# Patient Record
Sex: FEMALE | Race: WHITE | Employment: OTHER | URBAN - METROPOLITAN AREA
[De-identification: names, ages, dates, MRNs, and addresses within clinical notes are randomized per-mention and may not be internally consistent; named-entity substitution may affect disease eponyms.]

---

## 2018-09-18 ENCOUNTER — APPOINTMENT (OUTPATIENT)
Dept: CT IMAGING | Facility: CLINIC | Age: 48
End: 2018-09-18
Attending: EMERGENCY MEDICINE
Payer: OTHER GOVERNMENT

## 2018-09-18 ENCOUNTER — HOSPITAL ENCOUNTER (EMERGENCY)
Facility: CLINIC | Age: 48
Discharge: HOME | End: 2018-09-18
Attending: EMERGENCY MEDICINE | Admitting: EMERGENCY MEDICINE
Payer: OTHER GOVERNMENT

## 2018-09-18 VITALS
HEART RATE: 76 BPM | WEIGHT: 170 LBS | TEMPERATURE: 98.5 F | OXYGEN SATURATION: 99 % | DIASTOLIC BLOOD PRESSURE: 98 MMHG | BODY MASS INDEX: 27.32 KG/M2 | SYSTOLIC BLOOD PRESSURE: 146 MMHG | HEIGHT: 66 IN | RESPIRATION RATE: 16 BRPM
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
## 2018-09-18 DIAGNOSIS — S93.491A SPRAIN OF ANTERIOR TALOFIBULAR LIGAMENT OF RIGHT ANKLE, INITIAL ENCOUNTER: ICD-10-CM

## 2018-09-18 DIAGNOSIS — S00.83XA CONTUSION OF FACE, INITIAL ENCOUNTER: ICD-10-CM

## 2018-09-18 PROCEDURE — 99284 EMERGENCY DEPT VISIT MOD MDM: CPT | Mod: 25

## 2018-09-18 PROCEDURE — 70450 CT HEAD/BRAIN W/O DYE: CPT

## 2018-09-18 PROCEDURE — 72125 CT NECK SPINE W/O DYE: CPT

## 2018-09-18 ASSESSMENT — ENCOUNTER SYMPTOMS
BACK PAIN: 0
HEADACHES: 1
WOUND: 1
NECK PAIN: 0

## 2018-09-18 NOTE — ED AVS SNAPSHOT
Emergency Department    [address] [place] [address]    Phone:  [phone]    Fax:  [phone]                                       Christie Davis   MRN: [id number]    Department:   Emergency Department   Date of Visit:  9/18/2018           Patient Information     Date Of Birth          1970        Your diagnoses for this visit were:     Fall, initial encounter     Contusion of face, initial encounter     Sprain of anterior talofibular ligament of right ankle, initial encounter        You were seen by Natalia Ocampo MD.      Follow-up Information     Schedule an appointment as soon as possible for a visit with No Ref-Primary, Physician.    Why:  As needed        Discharge Instructions       Activity as tolerated, Motrin as needed.  Ice to the bruised areas.  Recheck in the clinic as needed.  If you develop severe headache with recurrent vomiting, return to the ER.        Understanding Ankle Sprain    The ankle is the joint where the leg and foot meet. Bones are held in place by connective tissue called ligaments. When ankle ligaments are stretched to the point of pain and injury, it is called an ankle sprain. A sprain can tear the ligaments. These tears can be very small but still cause pain. Ankle sprains can be mild or severe.  What causes an ankle sprain?  A sprain may occur when you twist your ankle or bend it too far. This can happen when you stumble or fall. Things that can make an ankle sprain more likely include:    Having had an ankle sprain before    Playing sports that involve running and jumping. Or playing contact sports such as football or hockey.    Wearing shoes that don t support your feet and ankles well    Having ankles with poor strength and flexibility  Symptoms of an ankle sprain  Symptoms may include:    Pain or soreness in the ankle    Swelling    Redness or bruising    Not being able to walk or put weight on the affected foot    Reduced range of  motion in the ankle    A popping or tearing feeling at the time the sprain occurs    An abnormal or dislocated look to the ankle    Instability or too much range of motion in the ankle  Treatment for an ankle sprain  Treatment focuses on reducing pain and swelling, and avoiding further injury. Treatments may include:    Resting the ankle. Avoid putting weight on it. This may mean using crutches until the sprain heals.    Prescription or over-the-counter pain medicines. These help reduce swelling and pain.    Cold packs. These help reduce pain and swelling.    Raising your ankle above your heart. This helps reduce swelling.    Wrapping the ankle with an elastic bandage or ankle brace. This helps reduce swelling and gives some support to the ankle. In rare cases, you may need a cast or boot.    Stretching and other exercises. These improve flexibility and strength.    Heat packs. These may be recommended before doing ankle exercises.  Possible complications of an ankle sprain  An ankle that has been weakened by a sprain can be more likely to have repeated sprains afterward. Doing exercises to strengthen your ankle and improve balance can reduce your risk for repeated sprains. Other possible complications are long-term (chronic) pain or an ankle that remains unstable.  When to call your healthcare provider  Call your healthcare provider right away if you have any of these:    Fever of 100.4 F (38 C) or higher, or as directed    Pain, numbness, discoloration, or coldness in the foot or toes    Pain that gets worse    Symptoms that don t get better, or get worse    New symptoms   Date Last Reviewed: 3/10/2016    [id number] [employer]. [address] [place] [address], [place] [address]. All rights reserved. This information is not intended as a substitute for professional medical care. Always follow your healthcare professional's instructions.          Discharge References/Attachments     HEAD INJURY, NO WAKE-UP  (ADULT) (ENGLISH)      24 Hour Appointment Hotline       To make an appointment at any [facility], call [phone] ([phone]). If you don't have a family doctor or clinic, we will help you find one. [facility] are conveniently located to serve the needs of you and your family.             Review of your medicines      Notice     You have not been prescribed any medications.            Procedures and tests performed during your visit     Cervical spine CT w/o contrast    Head CT w/o contrast      Orders Needing Specimen Collection     None      Pending Results     No orders found from 9/16/2018 to 9/19/2018.            Pending Culture Results     No orders found from 9/16/2018 to 9/19/2018.            Pending Results Instructions     If you had any lab results that were not finalized at the time of your Discharge, you can call the ED Lab Result RN at [phone]. You will be contacted by this team for any positive Lab results or changes in treatment. The nurses are available 7 days a week from 10A to 6:30P.  You can leave a message 24 hours per day and they will return your call.        Test Results From Your Hospital Stay        9/18/2018 10:05 PM      Narrative     CT SCAN OF THE HEAD WITHOUT CONTRAST   9/18/2018 9:42 PM     HISTORY: All, hit head, no LOC.  Headache.;     TECHNIQUE:  Axial images of the head and coronal reformations without  IV contrast material. Radiation dose for this scan was reduced using  automated exposure control, adjustment of the mA and/or kV according  to patient size, or iterative reconstruction technique.    COMPARISON: None.    FINDINGS:  The ventricles are normal in size, shape and configuration.   The brain parenchyma and subarachnoid spaces are normal. There is no  evidence of intracranial hemorrhage, mass, acute infarct or anomaly.  The visualized portions of the sinuses and mastoids appear normal. No  bleed or fractures are identified.        Impression      IMPRESSION: No acute pathology. No bleed or fractures are identified.  No brain contusions are seen.      ERIKA NULL MD         9/18/2018 10:05 PM      Narrative     CT CERVICAL SPINE WITHOUT CONTRAST   9/18/2018 9:41 PM     HISTORY: Fall, hit head, neck pain, no neuro symptoms., Fall, hit  head, neck pain, no neuro symptoms.;      TECHNIQUE: Axial images of the cervical spine were obtained without  intravenous contrast. Multiplanar reformations were performed.   Radiation dose for this scan was reduced using automated exposure  control, adjustment of the mA and/or kV according to patient size, or  iterative reconstruction technique.    COMPARISON: None.    FINDINGS: There is no evidence of fracture. There is straightening of  the cervical lordosis.      Craniocervical junction: Normal.     C1-C2:  Normal.     C2-C3:  Normal disc, facet joints, spinal canal and neural foramina.     C3-C4:  Normal disc, facet joints, spinal canal and neural foramina.     C4-C5:  Normal disc, facet joints, spinal canal and neural foramina.     C5-C6:  Normal disc, facet joints, spinal canal and neural foramina.      C6-C7:  Normal disc, facet joints, spinal canal and neural foramina.      C7-T1:   Normal disc, facet joints, spinal canal and neural foramina.        Impression     IMPRESSION:    1. Straightening of the cervical lordosis.  2. No fractures are identified.  3. No significant degenerative change.    ERIKA NULL MD                Clinical Quality Measure: Blood Pressure Screening     Your blood pressure was checked while you were in the emergency department today. The last reading we obtained was  BP: (!) 146/103 . Please read the guidelines below about what these numbers mean and what you should do about them.  If your systolic blood pressure (the top number) is less than 120 and your diastolic blood pressure (the bottom number) is less than 80, then your blood pressure is normal. There is nothing more that you need to do  "about it.  If your systolic blood pressure (the top number) is 120-139 or your diastolic blood pressure (the bottom number) is 80-89, your blood pressure may be higher than it should be. You should have your blood pressure rechecked within a year by a primary care provider.  If your systolic blood pressure (the top number) is 140 or greater or your diastolic blood pressure (the bottom number) is 90 or greater, you may have high blood pressure. High blood pressure is treatable, but if left untreated over time it can put you at risk for heart attack, stroke, or kidney failure. You should have your blood pressure rechecked by a primary care provider within the next 4 weeks.  If your provider in the emergency department today gave you specific instructions to follow-up with your doctor or provider even sooner than that, you should follow that instruction and not wait for up to 4 weeks for your follow-up visit.        Thank you for choosing [facility]       Thank you for choosing [facility] for your care. Our goal is always to provide you with excellent care. Hearing back from our patients is one way we can continue to improve our services. Please take a few minutes to complete the written survey that you may receive in the mail after you visit with us. Thank you!        [employer] Information     [employer] lets you send messages to your doctor, view your test results, renew your prescriptions, schedule appointments and more. To sign up, go to [email or web address] . Click on \"Log in\" on the left side of the screen, which will take you to the Welcome page. Then click on \"Sign up Now\" on the right side of the page.     You will be asked to enter the access code listed below, as well as some personal information. Please follow the directions to create your username and password.     Your access code is: [email or web address]  Expires: 2018 10:12 PM     Your access code will  in 90 days. If you need help or a new code, please " call your [facility] clinic or [phone].        Care EveryWhere ID     This is your Care EveryWhere ID. This could be used by other organizations to access your [facility] medical records  [id number]        Equal Access to Services     DECLAN MARSH : Larisa Crocker, waaxda luqadaha, qaybta kaalmada bradley, darlyn kang. So [facility] [phone].    ATENCIÓN: Si habla español, tiene a bentley disposición servicios gratuitos de asistencia lingüística. Llame al [phone].    We comply with applicable federal civil rights laws and [place] laws. We do not discriminate on the basis of race, color, national origin, age, disability, sex, sexual orientation, or gender identity.            After Visit Summary       This is your record. Keep this with you and show to your community pharmacist(s) and doctor(s) at your next visit.

## 2018-09-18 NOTE — ED AVS SNAPSHOT
Emergency Department    [address] [place] [address]    Phone:  [phone]    Fax:  [phone]                                       Christie Davis   MRN: [id number]    Department:   Emergency Department   Date of Visit:  9/18/2018           After Visit Summary Signature Page     I have received my discharge instructions, and my questions have been answered. I have discussed any challenges I see with this plan with the nurse or doctor.    ..........................................................................................................................................  Patient/Patient Representative Signature      ..........................................................................................................................................  Patient Representative Print Name and Relationship to Patient    ..................................................               ................................................  Date                                   Time    ..........................................................................................................................................  Reviewed by Signature/Title    ...................................................              ..............................................  Date                                               Time          22EPIC Rev 08/18

## 2018-09-19 NOTE — ED NOTES
Bed: ED21  Expected date:   Expected time:   Means of arrival:   Comments:  [address] 48 F fall, head/knee/ankle pain

## 2018-09-19 NOTE — ED PROVIDER NOTES
"  History     Chief Complaint:  Fall    HPI   Christie Davis is a 48 year old female who presents with EMS and her  for evaluation following a fall today. The patient reports that she tripped over a ford on the floor at [employer] at the [employer]. The patient describes that she tried to reach out with her hand but instead hit her head against the bar and then onto the concrete ground where again she hit her head. At this time, the patient denies any neck or back pain. She reports that she felt her right ankle pop. She has had 2 drinks since noon.  She had no loss of consciousness .    Allergies:  Allergies not on file     Medications:    No current outpatient prescriptions on file.    Past Medical History:    No past medical history on file.    Past Surgical History:    No past surgical history on file.    Family History:    No family history on file.    Social History:  The patient  reports that she has never smoked. She has never used smokeless tobacco. She reports that she drinks alcohol. She reports that she does not use illicit drugs.   Marital Status:       Review of Systems   Musculoskeletal: Negative for back pain and neck pain.   Skin: Positive for wound.   Neurological: Positive for headaches. Negative for syncope.   All other systems reviewed and are negative.      Physical Exam     Vital signs    Estimated body mass index is 27.44 kg/(m^2) as calculated from the following:    Height as of this encounter: 1.676 m (5' 6\").    Weight as of this encounter: 77.1 kg (170 lb).    Patient Vitals for the past 24 hrs:   BP Temp Temp src Pulse Resp SpO2 Height Weight   09/18/18 2220 (!) 146/98 - - - - - - -   09/18/18 2038 (!) 146/103 98.5  F (36.9  C) Oral 76 16 99 % 1.676 m (5' 6\") 77.1 kg (170 lb)        Physical Exam  Nursing note and vitals reviewed.    Constitutional:  Appears well-developed and well-nourished, comfortable.    HENT:    Nose normal.  No discharge.  No blood from the " nose.     Oropharynx is clear and moist.  No hemotympanum.  Eyes:    Conjunctivae are normal without injection. No lid droop.     Pupils are equal, round, and reactive to light.  Cardiovascular:  Normal rate, regular rhythm with normal S1 and S2.      Normal heart sounds and peripheral pulses 2+ and equal.       No murmur or advid.  Pulmonary:  Effort normal and breath sounds clear to auscultation bilaterally   No respiratory distress.  No stridor.     No wheezes. No rales.     GI:    Soft. No distension and no mass. No tenderness.      No rebound and no guarding. No flank pain.  No HSM.  Musculoskeletal:  Normal range of motion. No extremity deformity.  Some mild tenderness over the left lateral hip and anterior knee.  Full range of motion of the knee.     Tenderness over the right anterior talofibular ligament.  No malleolar tenderness, no fifth metatarsal pain.  No calcaneal tenderness.  Minimal swelling.  No cyanosis.                                      Neck in a collar, she does have some midline tenderness.  Neurological:   Alert and oriented. No cranial nerve deficit, no facial droop.     Exhibits good muscle tone. Coordination normal.      GCS eye subscore is 4. GCS verbal subscore is 5.      GCS motor subscore is 6.   Skin:    Skin is warm and dry. No rash noted. No diaphoresis.      No erythema. No pallor.  Abrasion over the left anterior knee.  Also a bruise over the left zygoma.  Psychiatric:   Behavior is normal. Appropriate mood and affect.     Judgment and thought content normal.       Emergency Department Course   Imaging:  Cervical spine CT w/o contrast   Final Result   IMPRESSION:     1. Straightening of the cervical lordosis.   2. No fractures are identified.   3. No significant degenerative change.      ERIKA NULL MD      Head CT w/o contrast   Final Result   IMPRESSION: No acute pathology. No bleed or fractures are identified.   No brain contusions are seen.         EIRKA NULL MD           I  communicated the results of the imaging studies with the patient who expressed understanding of these findings.        Emergency Department Course:  Past medical records, nursing notes, and vitals reviewed.  2038: I performed an exam of the patient and obtained history, as documented above.       The patient was sent for imaging studies while in the emergency department, findings above.     Rechecked the patient, findings and plan explained to the patient. Patient discharged home, status improved, with instructions regarding supportive care, medications, and reasons to return as well as the importance of close follow-up was reviewed.    Impression & Plan    Medical Decision Making:  The patient comes in after tripping and falling.  She did hit her head and had some neck pain.  She is brought in in a collar.  CT of the head and neck were normal.  The collar was removed and she felt much better.  He had a mild ankle sprain which according to the [place] ankle rules did not need imaging.  She was able to ambulate on it without difficulty.  She had a couple of other contusions.  Head injury instructions were sent home with the  and they will follow-up as needed.    Diagnosis:    ICD-10-CM    1. Fall, initial encounter W19.XXXA    2. Contusion of face, initial encounter S00.83XA    3. Sprain of anterior talofibular ligament of right ankle, initial encounter S93.491A        Disposition:  discharged to home. Activity as tolerated, Motrin as needed.  Ice to the bruised areas.  Recheck in the clinic as needed.  If you develop severe headache with recurrent vomiting, return to the ER.       Octavio WORTHY, am serving as a scribe at 8:38 PM on 9/18/2018 to document services personally performed by Natalia Ocampo MD based on my observations and the provider's statements to me.     EMERGENCY DEPARTMENT       Natalia Ocampo MD  09/19/18 0113

## 2018-09-19 NOTE — DISCHARGE INSTRUCTIONS
Activity as tolerated, Motrin as needed.  Ice to the bruised areas.  Recheck in the clinic as needed.  If you develop severe headache with recurrent vomiting, return to the ER.        Understanding Ankle Sprain    The ankle is the joint where the leg and foot meet. Bones are held in place by connective tissue called ligaments. When ankle ligaments are stretched to the point of pain and injury, it is called an ankle sprain. A sprain can tear the ligaments. These tears can be very small but still cause pain. Ankle sprains can be mild or severe.  What causes an ankle sprain?  A sprain may occur when you twist your ankle or bend it too far. This can happen when you stumble or fall. Things that can make an ankle sprain more likely include:    Having had an ankle sprain before    Playing sports that involve running and jumping. Or playing contact sports such as football or hockey.    Wearing shoes that don t support your feet and ankles well    Having ankles with poor strength and flexibility  Symptoms of an ankle sprain  Symptoms may include:    Pain or soreness in the ankle    Swelling    Redness or bruising    Not being able to walk or put weight on the affected foot    Reduced range of motion in the ankle    A popping or tearing feeling at the time the sprain occurs    An abnormal or dislocated look to the ankle    Instability or too much range of motion in the ankle  Treatment for an ankle sprain  Treatment focuses on reducing pain and swelling, and avoiding further injury. Treatments may include:    Resting the ankle. Avoid putting weight on it. This may mean using crutches until the sprain heals.    Prescription or over-the-counter pain medicines. These help reduce swelling and pain.    Cold packs. These help reduce pain and swelling.    Raising your ankle above your heart. This helps reduce swelling.    Wrapping the ankle with an elastic bandage or ankle brace. This helps reduce swelling and gives some support to  the ankle. In rare cases, you may need a cast or boot.    Stretching and other exercises. These improve flexibility and strength.    Heat packs. These may be recommended before doing ankle exercises.  Possible complications of an ankle sprain  An ankle that has been weakened by a sprain can be more likely to have repeated sprains afterward. Doing exercises to strengthen your ankle and improve balance can reduce your risk for repeated sprains. Other possible complications are long-term (chronic) pain or an ankle that remains unstable.  When to call your healthcare provider  Call your healthcare provider right away if you have any of these:    Fever of 100.4 F (38 C) or higher, or as directed    Pain, numbness, discoloration, or coldness in the foot or toes    Pain that gets worse    Symptoms that don t get better, or get worse    New symptoms   Date Last Reviewed: 3/10/2016    5187-2008 [employer]. [address] [place] [address], [place] [address]. All rights reserved. This information is not intended as a substitute for professional medical care. Always follow your healthcare professional's instructions.